# Patient Record
(demographics unavailable — no encounter records)

---

## 2025-05-13 NOTE — CONSULT LETTER
[Dear  ___] : Dear  [unfilled], [Courtesy Letter:] : I had the pleasure of seeing your patient, [unfilled], in my office today. [Please see my note below.] : Please see my note below. [Consult Closing:] : Thank you very much for allowing me to participate in the care of this patient.  If you have any questions, please do not hesitate to contact me. [Sincerely,] : Sincerely, [FreeTextEntry2] : Dr. Holland Craig  [address], [address] [place] [address]  [phone] [FreeTextEntry3] : Francisca Ureña MD Pediatric Otolaryngology / [place] [address] Tel [phone] Fax [phone]

## 2025-05-13 NOTE — ASSESSMENT
[FreeTextEntry1] : SANG is a 3 year old boy presenting for sleep disordered breathing, nasal trauma  large tonsils and adenoids but light sleep disordered breathing  - sleep study or observation  Sleep Disordered Breathing - Discussed options for observation, medical management, sleep study or surgery.  - recommend sleep study due to mild sx and very large tonsils - follow up after testing   Education provided: Sleep disordered breathing may indicate presence of Obstructive Sleep Apnea. Obstructive sleep apnea is a condition where there are there is a cessation of breathing due to upper airway obstruction during sleep. It can be caused by a number of anatomic issues including, but not limited to tonsillar hypertrophy, increased weight, nasal obstruction and airway issues. There can be snoring, but this may be normal. Sleep apnea can be suggested by history, but a sleep study is needed to diagnose it. Options include observation and correction of the anatomic abnormality, weight loss if weight is contributory.   Consent for Tonsillectomy and Adenoidectomy The risks, benefits and alternatives of tonsillectomy and adenoidectomy were discussed.   The risks of tonsillectomy include but are not limited to: bleeding, which can range from mild requiring observation to more serious or life-threatening bleeding necessitating hospitalization, blood transfusion, and/or return to the operating room for control; voice change, infection, pain, dehydration, swallowing difficulty, need for additional surgery, nasal regurgitation and risk of anesthesia (which will be discussed by the anesthesiologist). Benefits in the case of recurrent tonsillopharyngitis include a reduction (but not necessarily a complete cure) in the number of throat infections, and in the case of obstructive sleep apnea (JOSEPH) include a decrease in severity of JOSEPH, which can be curative, but in many cases residual JOSEPH may occur. Alternatives in the case of recurrent tonsillopharyngitis include observation and continued antibiotic treatment, and in the case of JOSEPH observation, medical therapy, Continuous Positive Airway Pressure(CPAP), Bilevel Positive Airway Pressure (BiPAP) other surgical options. Non-treatment of JOSEPH is associated with decreased sleep and its sequelae, and in severe cases can have cardiovascular complications.  The risks, benefits and alternatives of adenoidectomy were discussed. The risks include but are not limited to: bleeding, which can range from mild requiring observation to more serious necessitating hospitalization, blood transfusion, return to the operating room for control and in extreme cases death; voice change- specifically velopharyngeal insufficiency which can affect the nasal resonance; infection, pain, dehydration, swallowing difficulty, need for additional surgery, nasal regurgitation and risk of anesthesia (which will be discussed by the anesthesiologist). Benefits in the case of recurrent adenoiditis include a reduction (but not necessarily a complete cure) in the number of adenoid infections; in the case of nasal obstruction an improvement of nasal airway and decreased rhinorrhea; and in the case of obstructive sleep apnea (JOSEPH) include a decrease in severity of JOSEPH, which can be curative, but in many cases residual JOSEPH may occur. Alternatives in the case of recurrent adenoiditis include observation and continued antibiotic treatment; in the case of nasal obstruction observation or medical therapy including but not limited to antihistamines, intranasal/systemic steroids; and in the case of JOSEPH observation, medical therapy, Continuous Positive Airway Pressure(CPAP), Bilevel Positive Airway Pressure (BiPAP) other surgical options. Non-treatment of JOSEPH is associated with decreased sleep and its sequelae, and in severe cases can have cardiovascular complications.   Options/risks/benefits for intracapsular vs extracapsular tonsillectomy were discussed with the family.